# Patient Record
Sex: MALE | Race: WHITE | URBAN - METROPOLITAN AREA
[De-identification: names, ages, dates, MRNs, and addresses within clinical notes are randomized per-mention and may not be internally consistent; named-entity substitution may affect disease eponyms.]

---

## 2017-08-30 ENCOUNTER — OFFICE VISIT (OUTPATIENT)
Dept: ORTHOPEDICS | Facility: CLINIC | Age: 42
End: 2017-08-30

## 2017-08-30 VITALS
HEART RATE: 103 BPM | OXYGEN SATURATION: 98 % | WEIGHT: 241.2 LBS | SYSTOLIC BLOOD PRESSURE: 155 MMHG | DIASTOLIC BLOOD PRESSURE: 81 MMHG | HEIGHT: 70 IN | BODY MASS INDEX: 34.53 KG/M2

## 2017-08-30 DIAGNOSIS — M77.11 LATERAL EPICONDYLITIS OF RIGHT ELBOW: Primary | ICD-10-CM

## 2017-08-30 DIAGNOSIS — G56.03 BILATERAL CARPAL TUNNEL SYNDROME: ICD-10-CM

## 2017-08-30 PROCEDURE — 20551 NJX 1 TENDON ORIGIN/INSJ: CPT | Mod: RT | Performed by: ORTHOPAEDIC SURGERY

## 2017-08-30 PROCEDURE — 99203 OFFICE O/P NEW LOW 30 MIN: CPT | Mod: 25 | Performed by: ORTHOPAEDIC SURGERY

## 2017-08-30 RX ORDER — CYCLOBENZAPRINE HCL 10 MG
10 TABLET ORAL
COMMUNITY
Start: 2016-12-27

## 2017-08-30 RX ORDER — TERAZOSIN 1 MG/1
1 CAPSULE ORAL
COMMUNITY
Start: 2017-07-12

## 2017-08-30 RX ORDER — FLUOXETINE 40 MG/1
40 CAPSULE ORAL
COMMUNITY
Start: 2017-07-12

## 2017-08-30 RX ORDER — ATORVASTATIN CALCIUM 80 MG/1
80 TABLET, FILM COATED ORAL
COMMUNITY
Start: 2017-07-12

## 2017-08-30 RX ORDER — LISINOPRIL 20 MG/1
20 TABLET ORAL
COMMUNITY
Start: 2017-07-12

## 2017-08-30 RX ORDER — CALCIUM CARB/VITAMIN D3/VIT K1 500-100-40
TABLET,CHEWABLE ORAL
COMMUNITY
Start: 2017-07-24

## 2017-08-30 RX ORDER — TRIAMCINOLONE ACETONIDE 40 MG/ML
40 INJECTION, SUSPENSION INTRA-ARTICULAR; INTRAMUSCULAR ONCE
Qty: 1 ML | Refills: 0 | OUTPATIENT
Start: 2017-08-30 | End: 2017-08-30

## 2017-08-30 RX ORDER — LANCING DEVICE/LANCETS
KIT MISCELLANEOUS
COMMUNITY
Start: 2012-04-24

## 2017-08-30 ASSESSMENT — PAIN SCALES - GENERAL: PAINLEVEL: WORST PAIN (10)

## 2017-08-30 NOTE — PROGRESS NOTES
The patient's right elbow lateral epicondylitis was prepped with betadine solution after verification of allergies. Area approximately 10 cm x 10 cm prepped in a sterile fashion. After injection, betadine removed with soap and water and band-aids applied.    1cc Lidocaine 1%  NDC 8443-2470-49, LOT [id number],  2018  1cc Kenalog 40 NDC 9129-1037-91, LOT [id number],   injected into patient's right elbow lateral epicondylitis  by:   Valente Orlando PA-C, YAZMIN (Ortho)  Supervising Physician: Julien Van M.D., M.S.  Dept. of Orthopaedic Surgery  [facility]

## 2017-08-30 NOTE — PATIENT INSTRUCTIONS
Please remember to call and schedule a follow up appointment if one was recommended at your earliest convenience.  Orthopedics CLINIC HOURS TELEPHONE NUMBER   Dr. Carlota Garcia  Certified Medical Assistant   Monday & Wednesday   8am - 5pm  Thursday 1pm - 5pm  Friday 8am -11:30am Specialty schedulers:   [phone] to schedule your surgery.  Main Clinic:   [phone] to make an appointment with any provider.    Urgent Care locations:    [facility] Monday-Friday Closed  Saturday-Sunday 9am-5pm      Monday-Friday 12pm - 8pm  Saturday-Sunday 9am-5pm [phone]     If SURGERY has been recommended, please call our Specialty Schedulers at [phone] to schedule your procedure.    If you need a medication refill, please contact your pharmacy. Please allow 3 business days for your refill to be completed.    If an MRI or CT scan has been recommended, please call [facility] Schedulers at [phone] to schedule your appointment.  Use Docstoc (secure e-mail communication and access to your chart) to send a message or to make an appointment. Please ask how you can sign up for Docstoc.  Your care team's suggested websites for health information:   [email or web address] : Up to date and easily searchable information on multiple topics.   [email or web address] : [place] dept of heat, public health issues in [place], N1N1

## 2017-08-30 NOTE — NURSING NOTE
"Chief Complaint   Patient presents with     Elbow Pain     Right elbow pain. Onset: 10-15 years. Pain has gotten worse. NKI. Patient states his elbow locked up on him in 7/2017. He could not move his arm. Pain is over the elbow area. He describes the pain as someone is smashing his elbow with a hammer. Pain comes and goes but can be constant. He will have numbness from elbow down to hand and fingers. His hand will swell. He notices it more when it rains. He has used medication in the past for bursitis.        Initial /81  Pulse 103  Ht 1.784 m (5' 10.25\")  Wt 109.4 kg (241 lb 3.2 oz)  SpO2 98%  BMI 34.36 kg/m2 Estimated body mass index is 34.36 kg/(m^2) as calculated from the following:    Height as of this encounter: 1.784 m (5' 10.25\").    Weight as of this encounter: 109.4 kg (241 lb 3.2 oz).  Medication Reconciliation: hayden Santa Certified Medical Assistant    "

## 2017-08-30 NOTE — PROGRESS NOTES
"Chief Complaint:   Chief Complaint   Patient presents with     Elbow Pain     Right elbow pain. Onset: 10-15 years. Pain has gotten worse. NKI. Patient states his elbow locked up on him in 7/2017. He could not move his arm. Pain is over the elbow area. He describes the pain as someone is smashing his elbow with a hammer. Pain comes and goes but can be constant. He will have numbness from elbow down to hand and fingers. His hand will swell. He notices it more when it rains. He has used medication in the past for bursitis.         HPI: Alexander Villanueva is a 41 year old male , right -hand dominant, who presents for evaluation and management of a right elbow pain, without specific injury. Pain has been present for 10-15 years. He reports that his elbow had locked up on him in 7/2017. At that time, he was unable to move his arm. Today, patient reports he has the worst pain, rated a 10/10. His pain is located diffusely over the elbow area. With twisting his arm, he has a sharp pain over the lateral distal arm. He describes the pain as \"someone smashing his elbow with a hammer.\" His pain is intermittent, but can also be constant. Other symptoms include swelling, numbness and tingling radiating down to the hand and fingers. He finds that his symptoms are worsened by the weather (with rain) and at night. He has to hang his arm off the bed to stop the pain. He was seen in the past and was given Amitriptyline. For treatment, he uses medication he has used for bursitis. Patient feels that Amitriptyline does not help with the pain. He does not have insurance and needs to work.    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       He reports having mild pain/discomfort at the elbow. He denies numbness or tingling.   Pain severity: 10/10  Pain quality: aching and sharp  Frequency of symptoms: frequently  Associated symptoms: radiating pain to wrist, numbness/tingling into the wrist, hand and " fingers.  Aggravated by: with activity, with elbow range of motion, with rain  Relieved by: at rest, dangling arm down.  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     Past surgical history:  has a past surgical history that includes sebaceous cyst excision (2011).     Medications:    No current outpatient prescriptions on file.        Allergies:     Allergies   Allergen Reactions     Diclofenac      Other reaction(s): GI Upset     Ibuprofen Diarrhea and Nausea     Pioglitazone      Other reaction(s): GI Upset     Tramadol      Other reaction(s): GI Upset     Bupropion Rash        Family History: family history includes Hypertension in his father and paternal grandmother.     Social History: garage door install.   Social History     Social History     Marital status: Single     Spouse name: N/A     Number of children: N/A     Years of education: N/A     Occupational History     Not on file.     Social History Main Topics     Smoking status: Current Every Day Smoker     Smokeless tobacco: Current User     Alcohol use Not on file     Drug use: Not on file     Sexual activity: Not on file     Other Topics Concern     Not on file     Social History Narrative     No narrative on file         Review of Systems:  ROS: 10 point ROS neg other than the symptoms noted above in the HPI and past medical history.    This document serves as a record of the services and decisions personally performed and made by Julien Van MD. It was created on his behalf by Carolyn Chavez, a trained medical  "scribe. The creation of this document is based the provider's statements to the medical scribe.    Tino Carolyn Chavez 8:44 AM 8/30/2017    Physical Exam  GENERAL APPEARANCE: healthy, alert, no distress.   SKIN: no suspicious lesions or rashes  RESPIRATORY: No increased work of breathing.  NEURO: Normal strength and tone, mentation intact and speech normal  PSYCH:  mentation appears normal and affect normal. Not anxious.  Hands: increased wear pattern, dirt/grease.    /81  Pulse 103  Ht 1.784 m (5' 10.25\")  Wt 109.4 kg (241 lb 3.2 oz)  SpO2 98%  BMI 34.36 kg/m2       RIGHT UPPER EXTREMITY:  Sensation intact to light touch in median, radial, ulnar and axillary nerve distributions  Palpable 2+ radial pulse, brisk capillary refill to all fingers, wwp  Intact epl fpl fdp edc wrist flexion/extension biceps triceps deltoid    ELBOW:  Skin intact. No open wounds. No skin maceration.  Inspection: no swelling, no ecchymosis, no olecranon bursa swelling, no distal bicep tendon defect  Tender: lateral epicondyle, common extensor, medial epicondyle, flexor pronator mass  Range of Motion: all normal, equal to left side.  Strength: elbow strength full  Special tests:    Tinel's negative at wrist/median nerve.    Tinel's at the elbow: positive for pain.   Median nerve compression test wrist: positive.   Pain with resisted index finger extension, pain with resisted middle finger extension. Pain with resisted wrist extension. Minimal pain with resisted wrist flexion.  There is no deformity in the area.      LEFT UPPER EXTREMITY:  Sensation intact to light touch in median, radial, ulnar and axillary nerve distributions  Palpable 2+ radial pulse, brisk capillary refill to all fingers, wwp  Intact epl fpl fdp edc wrist flexion/extension biceps triceps deltoid    ELBOW:  Skin intact. No open wounds. No skin maceration.  Inspection: no swelling, no ecchymosis, no olecranon bursa swelling, no distal bicep tendon defect  Tender: " mild at lateral epicondyle  Range of Motion: all normal, equal to right side.  Strength: elbow strength full  Special tests:    Tinel's at the wrist negative.    Tinel's at the elbow: positive for pain, less than right side.   Median nerve compression test wrist: positive.  There is no deformity in the area.    X-rays: none    Assessment: 41 year old male, right -hand dominant, with chronic right elbow pain, lateral epicondylitis, likely bilateral carpal tunnel syndrome     Plan:   ELBOW:  * discussed with patient history and clinical exam consistent with tennis elbow, or lateral epicondylitis, which is tendonitis at the lateral aspect of the elbow.  * treatment is nonoperative, with surgical treatment reserved for recalcitrant symptoms despite long-term nonperative treatment regimen. Most cases expected to resolve over 1-2 years based on natural history.    Treatment includes:  * rest  * activity modification - avoid aggravating activities and reduce strenuous activities for 6-8 weeks  * ice, ice massage  * Physical therapy, modalities as indicated including ultrasound, massage, iontophoresis  * counterforce elbow brace/strap, available OTC  * wrist brace to prevent wrist extension  * modify lifting technique, palm upwards with lifting to relieve stress on extensor tendons of wrist.  * NDAIDS regularly three times daily x2-3 weeks  * cortisone injection discussed, placed at point of maximal tenderness.  * return to clinic as needed.      NUMBNESS AND TINGLING:  * discussed with patient signs and symptoms consistent with carpal tunnel syndrome. Carpal tunnel syndrome is compression or pinching of the median nerve at the wrist, as it enters the hand. There are many different causes, and in most cases, multifactorial.    * An indepth discussion was had with him about the options for treatment, which included activity modification to avoid aggravating activities, taking breaks during activities that cause symptoms,  stretching, NSAIDS to help decrease inflammation and swelling within the carpal tunnel, night splinting, corticosteroid injections, and carpal tunnel release.   * depending upon severity and duration of symptoms, nonoperative treatment is usually initiated, starting with least invasive modalities such as activity modification and a trial of night splints and NSAIDs.  * Cortisone injections are considered to decrease swelling and inflammation within the carpal tunnel and compression of the nerve.   * Lastly, carpal tunnel release should symptoms persist despite trial of nonoperative treatment, or in cases of severe carpal tunnel syndrome.  * at this time, will proceed with nothing.  * continue to monitor. If symptoms worsen, return to discuss treatment.  * return to clinic as needed.  * all patient's questions addressed and answered today.    PROCEDURE NOTE:  The risks, perceived benefits and potential complications (including but not limited to: bleeding, infection, pain, scar, damage to adjacent structures, atrophy or necrosis of soft tissue, skin blanching, failure to relieve symptoms, worsening of symptoms, allergic reaction) of injection were discussed with the patient. Questions were addressed and answered.The patient elected to proceed. Written informed consent was obtained. The correct procedural site was identified and confirmed. A Right Elbow lateral epicondyle injection was performed using 1mL Kenalog-40 40mg per mL and 1mL  of local anesthetic after sterile prep, to the correct procedural site. Sterile bandaid applied. This was tolerated well by the patient. No apparent complications. Did also discuss that if diabetic, recommend close monitoring of blood sugars over the next week as cortisone injections can temporarily elevate blood sugars.     The information in this document, created by a scribe for me, accurately reflects the services I personally performed and the decisions made by me. I have reviewed  and approved this document for accuracy.     Julien Van M.D., M.S.  Dept. of Orthopaedic Surgery  [facility]

## 2017-08-30 NOTE — MR AVS SNAPSHOT
After Visit Summary   8/30/2017    Alexander Villanueva    MRN: [id number]           Patient Information     Date Of Birth          1975        Visit Information        Provider Department      8/30/2017 8:00 AM Julien Van MD [facility]        Care Instructions    Please remember to call and schedule a follow up appointment if one was recommended at your earliest convenience.  Orthopedics CLINIC HOURS TELEPHONE NUMBER   Dr. Carlota Garcia  Certified Medical Assistant   Monday & Wednesday   8am - 5pm  Thursday 1pm - 5pm  Friday 8am -11:30am Specialty schedulers:   [phone] to schedule your surgery.  Main Clinic:   [phone] to make an appointment with any provider.    Urgent Care locations:    [facility] Monday-Friday Closed  Saturday-Sunday 9am-5pm      Monday-Friday 12pm - 8pm  Saturday-Sunday 9am-5pm [phone]     If SURGERY has been recommended, please call our Specialty Schedulers at [phone] to schedule your procedure.    If you need a medication refill, please contact your pharmacy. Please allow 3 business days for your refill to be completed.    If an MRI or CT scan has been recommended, please call [facility] Schedulers at [phone] to schedule your appointment.  Use turboBOTZt (secure e-mail communication and access to your chart) to send a message or to make an appointment. Please ask how you can sign up for [employer].  Your care team's suggested websites for health information:   [email or web address] : Up to date and easily searchable information on multiple topics.   [email or web address] : [place] dept of heat, public health issues in [place], N1N1              Follow-ups after your visit        Who to contact     If you have questions or need follow up information about today's clinic visit or your schedule please contact [facility] directly at [phone].  Normal or non-critical  "lab and imaging results will be communicated to you by MyChart, letter or phone within 4 business days after the clinic has received the results. If you do not hear from us within 7 days, please contact the clinic through Linekongt or phone. If you have a critical or abnormal lab result, we will notify you by phone as soon as possible.  Submit refill requests through [employer] or call your pharmacy and they will forward the refill request to us. Please allow 3 business days for your refill to be completed.          Additional Information About Your Visit        [employer] Information     [employer] lets you send messages to your doctor, view your test results, renew your prescriptions, schedule appointments and more. To sign up, go to [email or web address] . Click on \"Log in\" on the left side of the screen, which will take you to the Welcome page. Then click on \"Sign up Now\" on the right side of the page.     You will be asked to enter the access code listed below, as well as some personal information. Please follow the directions to create your username and password.     Your access code is: [email or web address]  Expires: 2017  8:30 AM     Your access code will  in 90 days. If you need help or a new code, please call your [facility] clinic or [phone].        Care EveryWhere ID     This is your Care EveryWhere ID. This could be used by other organizations to access your [facility] medical records  [id number]        Your Vitals Were     Pulse Height Pulse Oximetry BMI (Body Mass Index)          103 1.784 m (5' 10.25\") 98% 34.36 kg/m2         Blood Pressure from Last 3 Encounters:   17 155/81    Weight from Last 3 Encounters:   17 109.4 kg (241 lb 3.2 oz)              Today, you had the following     No orders found for display       Primary Care Provider    None Specified       No primary provider on file.        Equal Access to Services     MAR ACUÑA: sina Humphrey, " "cathy maddox. So [facility] [phone].    ATENCIÓN: Si nelli alonso, tiene a benitez disposición servicios gratuitos de asistencia lingüística. Jamison al [phone].    We comply with applicable federal civil rights laws and [place] laws. We do not discriminate on the basis of race, color, national origin, age, disability sex, sexual orientation or gender identity.            Thank you!     Thank you for choosing [facility]  for your care. Our goal is always to provide you with excellent care. Hearing back from our patients is one way we can continue to improve our services. Please take a few minutes to complete the written survey that you may receive in the mail after your visit with us. Thank you!             Your Updated Medication List - Protect others around you: Learn how to safely use, store and throw away your medicines at www.disposemymeds.org.          This list is accurate as of: 8/30/17  8:30 AM.  Always use your most recent med list.                   Brand Name Dispense Instructions for use Diagnosis    atorvastatin 80 MG tablet    LIPITOR     Take 80 mg by mouth        blood glucose monitoring meter device kit    no brand specified     Please dispense relion meter        [employer] LANCING DEVICE Misc      As directed. 4-5 times a day        cyclobenzaprine 10 MG tablet    FLEXERIL     Take 10 mg by mouth        FLUoxetine 40 MG capsule    PROzac     Take 40 mg by mouth        insulin regular 100 UNIT/ML injection    HumuLIN R/NovoLIN R     Up to 50 units daily depending on carbs and glucose. relion brand please        insulin syringe 31G X 5/16\" 0.3 ML Misc      For administering insulin at home for diabetes 250.00, FOR NPH twice daily        lisinopril 20 MG tablet    PRINIVIL/ZESTRIL     Take 20 mg by mouth        Suvorexant 5 MG tablet    BELSOMRA     Take 5 mg by mouth        terazosin 1 MG capsule    HYTRIN     Take 1 mg by mouth        "

## 2017-08-30 NOTE — LETTER
[address]  [place] [address]  [phone]      WORKABILITY    [facility], Dr. Julien Van M.D., DOT[employer] [facility] [facility]        8/30/2017      RE: Alexander Villanueva    [address]  [place] [address]        To whom it may concern:     Alexander Villanueva is under my professional care for right elbow lateral epicondylitis.     Mr. 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 Avoid heavy lifting with right upper extremity for today. Can return to regular duty on 8/31/17.          Valente Orlando PA-C, CAQ (Ortho)  Supervising Physician: Julien Van M.D., M.S.  Dept. of Orthopaedic Surgery  [facility]

## 2021-04-21 LAB — HBA1C MFR BLD: 7.3 % (ref 4–7)

## 2021-07-13 LAB — HBA1C MFR BLD: 7.2 % (ref 4–7)

## 2022-01-25 ENCOUNTER — TRANSFERRED RECORDS (OUTPATIENT)
Dept: HEALTH INFORMATION MANAGEMENT | Facility: CLINIC | Age: 47
End: 2022-01-25
Payer: COMMERCIAL

## 2022-01-25 LAB
CHOLESTEROL (EXTERNAL): 171 MG/DL (ref 0–200)
CREATININE (EXTERNAL): 0.7 MG/DL (ref 0.7–1.4)
GFR ESTIMATED (EXTERNAL): >127.9 ML/MIN/1.73M
GFR ESTIMATED (IF AFRICAN AMERICAN) (EXTERNAL): 154.8 ML/MIN/1.73M
GLUCOSE (EXTERNAL): 153 MG/DL (ref 70–100)
HBA1C MFR BLD: 7.4 % (ref 4–7)
HDLC SERPL-MCNC: 59 MG/DL (ref 40–60)
LDL CHOLESTEROL CALCULATED (EXTERNAL): 76 MG/DL (ref 0–100)
POTASSIUM (EXTERNAL): 4.7 MMOL/L (ref 3.5–5.3)
TRIGLYCERIDES (EXTERNAL): 181 MG/DL (ref 0–150)

## 2022-02-01 ENCOUNTER — TRANSFERRED RECORDS (OUTPATIENT)
Dept: HEALTH INFORMATION MANAGEMENT | Facility: CLINIC | Age: 47
End: 2022-02-01
Payer: COMMERCIAL

## 2022-03-03 ENCOUNTER — TRANSFERRED RECORDS (OUTPATIENT)
Dept: HEALTH INFORMATION MANAGEMENT | Facility: CLINIC | Age: 47
End: 2022-03-03
Payer: COMMERCIAL

## 2022-03-03 LAB
CREATININE (EXTERNAL): 1 MG/DL (ref 0.7–1.4)
GFR ESTIMATED (EXTERNAL): >84.8 ML/MIN/1.73M2
GFR ESTIMATED (IF AFRICAN AMERICAN) (EXTERNAL): 102.6 ML/MIN/1.73M2
GLUCOSE (EXTERNAL): 133 MG/DL (ref 70–100)
POTASSIUM (EXTERNAL): 4.7 MMOL/L (ref 3.5–5.3)

## 2022-03-16 ENCOUNTER — MEDICAL CORRESPONDENCE (OUTPATIENT)
Dept: HEALTH INFORMATION MANAGEMENT | Facility: CLINIC | Age: 47
End: 2022-03-16
Payer: COMMERCIAL